# Patient Record
(demographics unavailable — no encounter records)

---

## 2024-10-08 NOTE — DISCUSSION/SUMMARY
[Medication Risks Reviewed] : Medication risks reviewed [Surgical risks reviewed] : Surgical risks reviewed [de-identified] : 65-year-old male presents to the office status post Euflexxa injection 2 out of 3 to bilateral knees. Tolerated well. Will follow-up in 1 week for repeat injection. All questions addressed, patient agreement.

## 2024-10-08 NOTE — PROCEDURE
[de-identified] : Euflexxa # 2 right knee Discussed at length with the patient the planned Euflexxa injection. The risks, benefits, convalescence and alternatives were reviewed. The possible side effects discussed included but were not limited to: pain, swelling, heat and redness. There symptoms are generally mild but if they are extensive then contact the office. Giving pain relievers by mouth such as NSAIDs or Tylenol can generally treat the reactions to Euflexxa. Rare cases of infection have been noted. Rash, hives and itching may occur post injection. If you have muscle pain or cramps, flushing and or swelling of the face, rapid heart beat, nausea, dizziness, fever, chills, headache, difficulty breathing, swelling in the arms or legs, or have a prickly feeling of your skin, contact a health care provider immediately.  Following this discussion, the knee was prepped with betadine and under sterile condition the Euflexxa injection was performed with a 22 gauge needle. The needle was introduced into the joint, aspiration was performed to ensure intra-articular placement and the medication was injected. Upon withdrawal of the needle the site was cleaned with alcohol and a bandaid applied. The patient tolerated the injection well and there were no adverse effects. Post injection instructions included no strenuous activity for 24 hours, cryotherapy and if there are any adverse effects to contact the office.  Euflexxa # 2 left knee Discussed at length with the patient the planned Euflexxa injection. The risks, benefits, convalescence and alternatives were reviewed. The possible side effects discussed included but were not limited to: pain, swelling, heat and redness. There symptoms are generally mild but if they are extensive then contact the office. Giving pain relievers by mouth such as NSAIDs or Tylenol can generally treat the reactions to Euflexxa. Rare cases of infection have been noted. Rash, hives and itching may occur post injection. If you have muscle pain or cramps, flushing and or swelling of the face, rapid heart beat, nausea, dizziness, fever, chills, headache, difficulty breathing, swelling in the arms or legs, or have a prickly feeling of your skin, contact a health care provider immediately.  Following this discussion, the knee was prepped with betadine and under sterile condition the Euflexxa injection was performed with a 22 gauge needle. The needle was introduced into the joint, aspiration was performed to ensure intra-articular placement and the medication was injected. Upon withdrawal of the needle the site was cleaned with alcohol and a bandaid applied. The patient tolerated the injection well and there were no adverse effects. Post injection instructions included no strenuous activity for 24 hours, cryotherapy and if there are any adverse effects to contact the office.

## 2024-10-08 NOTE — HISTORY OF PRESENT ILLNESS
[de-identified] : 65-year-old male presents to the office for Euflexxa injection 2 out of 3 to bilateral knees

## 2024-10-15 NOTE — PROCEDURE
[de-identified] : Euflexxa # 3  right knee Discussed at length with the patient the planned Euflexxa injection. The risks, benefits, convalescence and alternatives were reviewed. The possible side effects discussed included but were not limited to: pain, swelling, heat and redness. There symptoms are generally mild but if they are extensive then contact the office. Giving pain relievers by mouth such as NSAIDs or Tylenol can generally treat the reactions to Euflexxa. Rare cases of infection have been noted. Rash, hives and itching may occur post injection. If you have muscle pain or cramps, flushing and or swelling of the face, rapid heart beat, nausea, dizziness, fever, chills, headache, difficulty breathing, swelling in the arms or legs, or have a prickly feeling of your skin, contact a health care provider immediately.  Following this discussion, the knee was prepped with betadine and under sterile condition the Euflexxa injection was performed with a 22 gauge needle. The needle was introduced into the joint, aspiration was performed to ensure intra-articular placement and the medication was injected. Upon withdrawal of the needle the site was cleaned with alcohol and a bandaid applied. The patient tolerated the injection well and there were no adverse effects. Post injection instructions included no strenuous activity for 24 hours, cryotherapy and if there are any adverse effects to contact the office.  Euflexxa # 3 left knee Discussed at length with the patient the planned Euflexxa injection. The risks, benefits, convalescence and alternatives were reviewed. The possible side effects discussed included but were not limited to: pain, swelling, heat and redness. There symptoms are generally mild but if they are extensive then contact the office. Giving pain relievers by mouth such as NSAIDs or Tylenol can generally treat the reactions to Euflexxa. Rare cases of infection have been noted. Rash, hives and itching may occur post injection. If you have muscle pain or cramps, flushing and or swelling of the face, rapid heart beat, nausea, dizziness, fever, chills, headache, difficulty breathing, swelling in the arms or legs, or have a prickly feeling of your skin, contact a health care provider immediately.  Following this discussion, the knee was prepped with betadine and under sterile condition the Euflexxa injection was performed with a 22 gauge needle. The needle was introduced into the joint, aspiration was performed to ensure intra-articular placement and the medication was injected. Upon withdrawal of the needle the site was cleaned with alcohol and a bandaid applied. The patient tolerated the injection well and there were no adverse effects. Post injection instructions included no strenuous activity for 24 hours, cryotherapy and if there are any adverse effects to contact the office.

## 2024-10-15 NOTE — HISTORY OF PRESENT ILLNESS
[de-identified] : 65-year-old male presents to the office for Euflexxa injection 3 out of 3 to bilateral knees

## 2024-10-15 NOTE — DISCUSSION/SUMMARY
[Medication Risks Reviewed] : Medication risks reviewed [Surgical risks reviewed] : Surgical risks reviewed [de-identified] : 65-year-old male presents to the office status post Euflexxa injection 3 out of 3 to bilateral knees. Tolerated well. Will follow-up in 12 weeks for repeat evaluation. All questions addressed, patient agreement.

## 2024-10-20 NOTE — ASSESSMENT
[FreeTextEntry1] : Assessment: Onychomycosis caused by T. Rubrum.  Plan: I debrided each toenail via mechanical trimming and electrical grinding.  All toenails were trimmed with a 14 cm sterile stainless steel box lock double spring nail splitter.  Then utilizing a sterile pear shaped violeta joaquin (this device falls under bur, surgical, general & plastic surgery.  The FDA deems this item a Class-1 device) via a 35,000 RPM electric drill and vacuum and dust extractor system all toenails were aseptically debrided removing fungal layers.  This is done to diminish the fungal load of the toenails and enhance the effects of the antifungal medication, allowing overall improvement in the degree of fungal infection as well as improve appearance and reduce discomfort and help diminish chances of secondary bacterial infection, also lessening the chance of ingrown nails, especially when performed on a regular basis.  I instructed the patient to continue with the antifungal therapy provided everyday and use the Clean Sweep antifungal spray to disinfect their shoes, as continued improvement was noted.  He was encouraged to call the office with any questions or problems as they may arise.  PTR: 2 months.

## 2024-12-23 NOTE — ASSESSMENT
[FreeTextEntry1] : Vitally stable No active complaints Prescription given for blood work RTC in March for CPE All refills ordered

## 2024-12-23 NOTE — COUNSELING
[Yes] : Risk of tobacco use and health benefits of smoking cessation discussed: Yes [Cessation strategies including cessation program discussed] : Cessation strategies including cessation program discussed [Use of nicotine replacement therapies and other medications discussed] : Use of nicotine replacement therapies and other medications discussed [Encouraged to pick a quit date and identify support needed to quit] : Encouraged to pick a quit date and identify support needed to quit [No] : Not willing to quit smoking [FreeTextEntry1] : 10

## 2024-12-23 NOTE — PHYSICAL EXAM
[Normal] : no carotid or abdominal bruits heard, no varicosities, pedal pulses are present, no peripheral edema, no extremity clubbing or cyanosis and no palpable aorta [de-identified] : All teeth missing and oral cavity [de-identified] : Psoriatec patches noted on extensor surface of both arms

## 2024-12-23 NOTE — HISTORY OF PRESENT ILLNESS
[FreeTextEntry8] :   patient presented today to establish care with new provider.  Patient goes to orthopedic, podiatrist, cardiologist, oncologist.  He is chronic smoker and some lung nodule was found on his CT scan which is being managed by pulmonologist/oncologist.  Patient stated that he does not want to quit smoking and nicotine etc. does not work for him. He also needs refills and blood work today Denies any active complaints.  Has history of knee surgery, bilateral hip surgery, 7 stents placed in 2011.

## 2025-01-16 NOTE — PHYSICAL EXAM
[de-identified] : Left knee exam shows moderate effusion, ROM is 5-125 degrees, no instability, pain with Dylan, medial joint line tenderness. Right knee exam shows mild effusion, ROM is 5-125 degrees, no instability, pain with Dylan, medial joint line tenderness. 5/5 motor strength in bilateral lower extremities. Sensory: Intact in bilateral lower extremities. DTRs: Biceps, brachioradialis, triceps, patellar, ankle and plantar 2+ and symmetric bilaterally. Pulses: dorsalis pedis, posterior tibial, femoral, popliteal, and radial 2+ and symmetric bilaterally.

## 2025-01-16 NOTE — DISCUSSION/SUMMARY
[Medication Risks Reviewed] : Medication risks reviewed [Surgical risks reviewed] : Surgical risks reviewed [de-identified] : 73-year-old male presents to the office for follow-up of severe bilateral knee osteoarthritis.  65-year-old male presents to the office for follow-up of his moderate to severe bilateral knee osteoarthritis.  The patient completed a series of viscosupplementation in October and has had excellent resolution of his symptoms.  He is having very minimal pain today therefore we will defer any cortisone injections.  The patient was advised he will be eligible for repeat injections in 3 months time.  I will order those once he is eligible.  I recommend he continue with his at home exercise regimen.  He may take Tylenol and NSAIDs as needed for pain.  The patient should follow-up once viscosupplementation is approved in 3 months.  All questions addressed, patient in agreement.

## 2025-01-16 NOTE — HISTORY OF PRESENT ILLNESS
[de-identified] : 65-year-old male presents to the office for follow-up of bilateral knee pain, patient reports left extremity.  The patient completed a series of viscosupplementation in October.  States that this provided excellent resolution of his symptoms.  He takes aspirin occasionally for pain.  Ambulates without use of assistive device.  Performs at home exercises as tolerated.  Denies any recent injuries falls or trauma, locking catching or buckling knee, radiation of pain to the hip or back, neurovascular compromise.

## 2025-01-18 NOTE — PHYSICAL EXAM
[TextEntry] : On toes 1, 2 3, 4 and 5 left foot and 1, 2, 3, 4 and 5 right foot the toenails exhibited a yellowish discoloration with thickening.  Upon palpation of the toenails increased pain was elicited.  Improvement was noted in the  bases of the toenails. The vascular, orthopedic and dermatological status of each foot was otherwise unchanged.

## 2025-01-18 NOTE — HISTORY OF PRESENT ILLNESS
[FreeTextEntry1] : The patient returns to the office with a chief complaint of tenderness and pain on the toenails of toes 1, 2 3, 4 and 5 left foot and 1, 2, 3, 4 and 5 right foot.  The patient states that when he walks the pain gets worse.  Certain shoes are more bothersome than others. An updated medical history did not reveal any changes.  The patient wants to know what else can be done for his nails since he still sees discoloration. .

## 2025-01-18 NOTE — ASSESSMENT
[FreeTextEntry1] : Assessment: Onychomycosis caused by T. Rubrum.  Plan: The toenails 1 - 5 on both feet were debrided mechanically and then were electrically burred to a more normal appearance to reduce the fungal load and allow the antifungal medication to work more effectively.   All toenails were trimmed with a 14 cm sterile stainless steel box lock double spring nail splitter.  Then utilizing a sterile pear shaped violeta joaquin (this device falls under bur, surgical, general & plastic surgery.  The FDA deems this item a Class-1 device) via a 35,000 RPM electric drill and vacuum and dust extractor system all toenails were aseptically debrided removing fungal layers.  This is done to diminish the fungal load of the toenails and enhance the effects of the antifungal medication, allowing overall improvement in the degree of fungal infection as well as improve appearance and reduce discomfort and help diminish chances of secondary bacterial infection, also lessening the chance of ingrown nails, especially when performed on a regular basis.  The patient was encouraged to continue with the topical antifungal medication everyday and use the Clean Sweep antifungal spray to disinfect their shoes. He was advised to call the office at any time with any problems or questions.  I discussed oral Lamisil medication to treat the patient's fungal toenails.   I explained the risks, benefits and alternatives to the medication.  I explained that they would need blood tests to confirm there were no liver or other issues. I explained that they would need to take a total of 90 tablets to be taken one a day for 3 months.  I explained that they would still need to have toenail debridements every 2 months.  I also instructed the patient that it would still take over a year for the toenails to grow out.  He asked and I Discussed bunion surgery as well.  He wants to think about both.  PTR: 2 months.

## 2025-02-26 NOTE — HISTORY OF PRESENT ILLNESS
[TextBox_4] : 64 yo man last seen prior to hip operation in May--it went well Long term and persistent smoker--now 1 ppd COPD with moderate-severe obstruction taking Anoro daily and occasional albuterol CT chest at [facility] in April 2024 showed multiple nodules, subcentimeter, mixed density but no adenopathy--this was prior to his Hip operation A followup scan was recommended Patient has EDEN, he doesnt use his CPAP much and has not returned to the sleep office for f/u Hx of CAD s/p multiple stenting procedures.   CT chest was done at [facility] in July 2024 with NO comparison to old films The nodules were not noted altho a few punctate small nodules were noted and a RLL ca granuloma seen This film showed 1.4 cm AP window node with small bilateral hilar nodes seen up to 1 cm A repeat CT chest obtained at [facility] on Feb 6 2025 was reviewed with patient This was compared to April 2024 films by radiologist Continued emphysematous changes, especially in upper lungs as before Resolution of previously seen GG lesions in lungs Persistent smal medisatinal adenopathy which is unchanged  Interim: Patient says he feels the same --only wants his anoro renewed Smoking 1 ppd regularly unfortunately Films reviewed as noted above

## 2025-02-26 NOTE — ASSESSMENT
[FreeTextEntry1] : 64 yo man last seen prior to hip operation in May--it went well Long term and persistent smoker--now 1 ppd COPD with moderate-severe obstruction taking Anoro daily and occasional albuterol CT chest at [facility] in April 2024 showed multiple nodules, subcentimeter, mixed density but no adenopathy--this was prior to his Hip operation A followup scan was recommended Patient has EDEN, he doesnt use his CPAP much and has not returned to the sleep office for f/u Hx of CAD s/p multiple stenting procedures.   CT chest was done at [facility] in July 2024 with NO comparison to old films The nodules were not noted altho a few punctate small nodules were noted and a RLL ca granuloma seen This film showed 1.4 cm AP window node with small bilateral hilar nodes seen up to 1 cm A repeat CT chest obtained at [facility] on Feb 6 2025 was reviewed with patient This was compared to April 2024 films by radiologist Continued emphysematous changes, especially in upper lungs as before Resolution of previously seen GG lesions in lungs Persistent smal medisatinal adenopathy which is unchanged  Interim: Patient says he feels the same --only wants his anoro renewed Smoking 1 ppd regularly unfortunately Films reviewed as noted above   Imp  64 yo man with COPD On Anoro and albuterol Persists in smoking 1 ppd CT findings are stable will need f/u CT [facility]CS in one year EDEN not treated--he says he uses CPAP intermittently We had another long discussion regarding his continued smoking for 20 minutes He continues to be resistant to making any efforts to stop smoking  RTC 6 months for PFTs full

## 2025-03-17 NOTE — COUNSELING
[Yes] : Risk of tobacco use and health benefits of smoking cessation discussed: Yes [Cessation strategies including cessation program discussed] : Cessation strategies including cessation program discussed [Use of nicotine replacement therapies and other medications discussed] : Use of nicotine replacement therapies and other medications discussed [Encouraged to pick a quit date and identify support needed to quit] : Encouraged to pick a quit date and identify support needed to quit [No] : Not willing to quit smoking [FreeTextEntry3] : 15 [Potential consequences of obesity discussed] : Potential consequences of obesity discussed [Benefits of weight loss discussed] : Benefits of weight loss discussed [Structured Weight Management Program suggested:] : Structured weight management program suggested [Encouraged to maintain food diary] : Encouraged to maintain food diary [Encouraged to increase physical activity] : Encouraged to increase physical activity [Target Wt Loss Goal ___] : Weight Loss Goals: Target weight loss goal [unfilled] lbs [Weigh Self Weekly] : weigh self weekly [Decrease Portions] : decrease portions [Keep Food Diary] : keep food diary [Good understanding] : Patient has a good understanding of disease, goals and obesity follow-up plan [FreeTextEntry4] : 15

## 2025-03-17 NOTE — HEALTH RISK ASSESSMENT
[Current] : Current [Good] : ~his/her~  mood as  good [FreeTextEntry1] : none [Yes] : Yes [Monthly or less (1 pt)] : Monthly or less (1 point) [PHQ-2 Negative - No further assessment needed] : PHQ-2 Negative - No further assessment needed [I have developed a follow-up plan documented below in the note.] : I have developed a follow-up plan documented below in the note. [Time Spent: ___ Minutes] : I spent [unfilled] minutes performing a depression screening for this patient. [de-identified] : Podiatrist, cardiologist, pulmonologist [Audit-CScore] : 1 [de-identified] : Not very active [de-identified] : Unhealthy diet, lots of junk food, carbs, sweets [20 or more] : 20 or more [Patient reported colonoscopy was abnormal] : Patient reported colonoscopy was abnormal [HIV test declined] : HIV test declined [Hepatitis C test declined] : Hepatitis C test declined [Change in mental status noted] : No change in mental status noted [With Significant Other] : lives with significant other [Retired] : retired [] :  [# Of Children ___] : has [unfilled] children [Sexually Active] : sexually active [Feels Safe at Home] : Feels safe at home [Fully functional (bathing, dressing, toileting, transferring, walking, feeding)] : Fully functional (bathing, dressing, toileting, transferring, walking, feeding) [ColonoscopyDate] : 2024 [ColonoscopyComments] : Inconclusive [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

## 2025-03-17 NOTE — ASSESSMENT
[FreeTextEntry1] :  Routine measurements including height, weight, BMI, and blood pressure performed. Medications reviewed and reconciled. Tobacco, alcohol, and drug screening performed. Annual depression screening performed. Diet and exercise discussed. bw done today  will review over phone

## 2025-03-17 NOTE — PHYSICAL EXAM
[Normal] : affect was normal and insight and judgment were intact [de-identified] : All teeth missing  , obesity [de-identified] : Psoriatec patches noted on extensor surface of both arms

## 2025-03-17 NOTE — HISTORY OF PRESENT ILLNESS
[FreeTextEntry1] : cpe [de-identified] :  patient presented today for complete physical assessment.  Accompanied by his wife.   Patient goes to a pulmonologist and oncologist for monitoring lung nodules.  His last CT lungs was unremarkable in 2025.  Advised to repeat it in 1 year.   Patient is a chronic smoker for last 40 years.  Not planning to quit.  He stated that he has tried nicotine patches, lozenges, inhaler, Chantix, Wellbutrin in the past but nothing works for him.  Also admits to eating lots of junk food, candies, sweets at home.  He states that he sleeps a lot during the day and just watches TV and eats.  His wife goes to [employer] her grandkids.  Patient is supposed to use CPAP for sleep apnea but he stated that he only uses it sometimes because it is very uncomfortable to sleep with it.   Colonoscopy 2024 was inconclusive because of poor bowel prep

## 2025-04-26 NOTE — HISTORY OF PRESENT ILLNESS
[FreeTextEntry1] : S:The patient presented to the office with a complaint of fungal nails that are irritated especially when his toes rub against the shoes. He said that this care of the toenails enables him to walk without pain and without it he would have a greatly limited ability to walk.   He also complained of dry skin on the bottom of his feet. He admits not applying cream.

## 2025-04-26 NOTE — ASSESSMENT
[FreeTextEntry1] : Assessment: Onychomycosis caused by T Rubrum Xerosis of the skin bilat.  Plan: Aseptic debridement was performed on all toenails utilizing electric burring and mechanical debridement.  All toenails were trimmed with a 14 cm sterile stainless steel box lock double spring nail splitter.  Then utilizing a sterile pear shaped violeta joaquin (this device falls under bur, surgical, general & plastic surgery.  The FDA deems this item a Class-1 device) via a 35,000 RPM electric drill and vacuum and dust extractor system all toenails were aseptically debrided removing fungal layers.  This is done to diminish the fungal load of the toenails and enhance the effects of the antifungal medication, allowing overall improvement in the degree of fungal infection as well as improve appearance and reduce discomfort and help diminish chances of secondary bacterial infection, also lessening the chance of ingrown nails, especially when performed on a regular basis.  The patient was encouraged to continue with the topical antifungal medication everyday and use the Clean Sweep antifungal spray to disinfect their shoes.  The patient was eprescribed Ammonium Lactate 12% Cream to be applied and are to massage it in to both feet 2x a day to reduce the dry skin and improve the appearance as well as help prevent cracks in the skin leading to infection.  PTR: 2 months.

## 2025-04-26 NOTE — PHYSICAL EXAM
[TextEntry] : Toes 1, 2, 3, 4 and 5 right foot and toes 1, 2, 3, 4 and 5 left foot appeared to have thickened nails with discoloration. An odor indicating fungus was present. The discoloration of the nail was brownish yellow with thickening present. Erythema surrounded the nail plate regions. Pain was elicited upon palpation of the toenails bilaterally.   Dry skin with peeling was noted on the plantar 1st metatarsal head regions and the heels of each foot.

## 2025-07-02 NOTE — PHYSICAL EXAM
[TextEntry] : Toes 1, 2, 3, 4 and 5 right foot and toes 1, 2, 3, 4 and 5 left foot appeared thickened and tender to the touch.  Pain was elicited on palpation, especially in the corners of the toes.  Patient exhibited a facial expression of pain on palpation.  The surrounding nail plates were swollen and erythematous.  The patient exhibited marked limitation of ambulation.  I reviewed the ROS and no other changes in podiatric status were observed including dermatological, orthopedic and vascular.

## 2025-07-02 NOTE — HISTORY OF PRESENT ILLNESS
Left message lab orders in system  
Pt called in to discuss his blood work he did back in march because he never heard anything from the office. Pt states he may have did them at the wrong lab or not at all and is asking if he should still get the blood work done and if so can he  a copy of the order for them?    Please call and advise [phone]  
[FreeTextEntry1] : The patient returns stating that the previous care of his nails gave him great relief.  He said that the nails after a while became uncomfortable and was irritating adjacent toes on toes 1, 2, 3, 4 and 5 right foot and 1, 2, 3, 4 and 5, left foot.  He said it was hard to walk when his toenails hurt.

## 2025-07-02 NOTE — ASSESSMENT
[FreeTextEntry1] : Assessment: Onychomycosis caused by T. Rubrum.  Plan: Debridement of each toenail on each foot was performed both mechanically and via electrical grinding.  All toenails were trimmed with a 14 cm sterile stainless steel box lock double spring nail splitter.  Then utilizing a sterile pear shaped violeta joaquin (this device falls under bur, surgical, general & plastic surgery.  The FDA deems this item a Class-1 device) via a 35,000 RPM electric drill and vacuum and dust extractor system all toenails were aseptically debrided removing fungal layers.  This is done to diminish the fungal load of the toenails and enhance the effects of the antifungal medication, allowing overall improvement in the degree of fungal infection as well as improve appearance and reduce discomfort and help diminish chances of secondary bacterial infection, also lessening the chance of ingrown nails, especially when performed on a regular basis.  A topical antifungal was used and he was encouraged to continue using the antifungal prescribed everyday and use the Clean Sweep antifungal spray to disinfect their shoes.  PTR: 2 months.

## 2025-07-17 NOTE — PHYSICAL EXAM
[de-identified] : Musculoskeletal:. Left knee exam shows moderate effusion, ROM is 5-125 degrees, no instability, pain with Dylan, medial joint line tenderness. Right knee exam shows mild effusion, ROM is 5-125 degrees, no instability, pain with Dylan, medial joint line tenderness. 5/5 motor strength in bilateral lower extremities. Sensory: Intact in bilateral lower extremities. DTRs: Biceps, brachioradialis, triceps, patellar, ankle and plantar 2+ and symmetric bilaterally. Pulses: dorsalis pedis, posterior tibial, femoral, popliteal, and radial 2+ and symmetric bilaterally.

## 2025-07-17 NOTE — HISTORY OF PRESENT ILLNESS
[de-identified] : 65yo male with known history of bilateral knee OA presents for euflexxa injection #1 of 3 to bilateral knees. Patient elects to proceed with viscosupplementation today.

## 2025-07-17 NOTE — DISCUSSION/SUMMARY
[Medication Risks Reviewed] : Medication risks reviewed [Surgical risks reviewed] : Surgical risks reviewed [de-identified] : Patient presents status post euflexxa injection # 1 of 3 to bilateral knee today. Patient tolerated injections well. Patient will follow up in 1 week for injection as planned. All questions asked and answered.

## 2025-07-17 NOTE — PROCEDURE
[de-identified] : Euflexxa # 1 BILATERAL  knee Discussed at length with the patient the planned Euflexxa injection. The risks, benefits, convalescence and alternatives were reviewed. The possible side effects discussed included but were not limited to: pain, swelling, heat and redness. There symptoms are generally mild but if they are extensive then contact the office. Giving pain relievers by mouth such as NSAIDs or Tylenol can generally treat the reactions to Euflexxa. Rare cases of infection have been noted. Rash, hives and itching may occur post injection. If you have muscle pain or cramps, flushing and or swelling of the face, rapid heart beat, nausea, dizziness, fever, chills, headache, difficulty breathing, swelling in the arms or legs, or have a prickly feeling of your skin, contact a health care provider immediately.  Following this discussion, the knee was prepped with betadine and under sterile condition the Euflexxa injection was performed with a 22 gauge needle. The needle was introduced into the joint, aspiration was performed to ensure intra-articular placement and the medication was injected. Upon withdrawal of the needle the site was cleaned with alcohol and a bandaid applied. The patient tolerated the injection well and there were no adverse effects. Post injection instructions included no strenuous activity for 24 hours, cryotherapy and if there are any adverse effects to contact the office

## 2025-07-24 NOTE — DISCUSSION/SUMMARY
[Medication Risks Reviewed] : Medication risks reviewed [Surgical risks reviewed] : Surgical risks reviewed [de-identified] : Patient presents status post euflexxa injection # 2 of 3 to bilateral knee today. Patient tolerated injections well. Patient will follow up in 1 week for injection as planned. All questions asked and answered.

## 2025-07-24 NOTE — PROCEDURE
[de-identified] : Euflexxa #2 BILATERAL  knee Discussed at length with the patient the planned Euflexxa injection. The risks, benefits, convalescence and alternatives were reviewed. The possible side effects discussed included but were not limited to: pain, swelling, heat and redness. There symptoms are generally mild but if they are extensive then contact the office. Giving pain relievers by mouth such as NSAIDs or Tylenol can generally treat the reactions to Euflexxa. Rare cases of infection have been noted. Rash, hives and itching may occur post injection. If you have muscle pain or cramps, flushing and or swelling of the face, rapid heart beat, nausea, dizziness, fever, chills, headache, difficulty breathing, swelling in the arms or legs, or have a prickly feeling of your skin, contact a health care provider immediately.  Following this discussion, the knee was prepped with betadine and under sterile condition the Euflexxa injection was performed with a 22 gauge needle. The needle was introduced into the joint, aspiration was performed to ensure intra-articular placement and the medication was injected. Upon withdrawal of the needle the site was cleaned with alcohol and a bandaid applied. The patient tolerated the injection well and there were no adverse effects. Post injection instructions included no strenuous activity for 24 hours, cryotherapy and if there are any adverse effects to contact the office

## 2025-07-24 NOTE — HISTORY OF PRESENT ILLNESS
[de-identified] : 65yo male with known history of bilateral knee OA presents for euflexxa injection #2 of 3 to bilateral knees.

## 2025-07-29 NOTE — REASON FOR VISIT
[Follow-Up Visit] : a follow-up visit for [FreeTextEntry2] : Bilateral euflexxa injection #3 Lot number: [id number]. Exp: 8/30/2026

## 2025-07-29 NOTE — HISTORY OF PRESENT ILLNESS
[de-identified] : 67yo male with known history of bilateral knee OA presents for euflexxa injection #3 of 3 to bilateral knees.

## 2025-07-29 NOTE — DISCUSSION/SUMMARY
[Medication Risks Reviewed] : Medication risks reviewed [Surgical risks reviewed] : Surgical risks reviewed [de-identified] : Patient presents status post euflexxa injection # 3 of 3 to bilateral knee today. Patient tolerated injections well. Patient will follow up in 12 weeks for repeat evaluation. All questions asked and answered.

## 2025-07-29 NOTE — PROCEDURE
[de-identified] : Euflexxa #3 BILATERAL  knee Discussed at length with the patient the planned Euflexxa injection. The risks, benefits, convalescence and alternatives were reviewed. The possible side effects discussed included but were not limited to: pain, swelling, heat and redness. There symptoms are generally mild but if they are extensive then contact the office. Giving pain relievers by mouth such as NSAIDs or Tylenol can generally treat the reactions to Euflexxa. Rare cases of infection have been noted. Rash, hives and itching may occur post injection. If you have muscle pain or cramps, flushing and or swelling of the face, rapid heart beat, nausea, dizziness, fever, chills, headache, difficulty breathing, swelling in the arms or legs, or have a prickly feeling of your skin, contact a health care provider immediately.  Following this discussion, the knee was prepped with betadine and under sterile condition the Euflexxa injection was performed with a 22 gauge needle. The needle was introduced into the joint, aspiration was performed to ensure intra-articular placement and the medication was injected. Upon withdrawal of the needle the site was cleaned with alcohol and a bandaid applied. The patient tolerated the injection well and there were no adverse effects. Post injection instructions included no strenuous activity for 24 hours, cryotherapy and if there are any adverse effects to contact the office